# Patient Record
(demographics unavailable — no encounter records)

---

## 2024-11-27 NOTE — PHYSICAL EXAM
[Normocephalic] : normocephalic [Atraumatic] : atraumatic [Supple] : supple [No Supraclavicular Adenopathy] : no supraclavicular adenopathy [No Cervical Adenopathy] : no cervical adenopathy [Examined in the supine and seated position] : examined in the supine and seated position [Symmetrical] : symmetrical [Bra Size: ___] : Bra Size: [unfilled] [No dominant masses] : no dominant masses in right breast  [No dominant masses] : no dominant masses left breast [No Nipple Retraction] : no left nipple retraction [No Nipple Discharge] : no left nipple discharge [No Axillary Lymphadenopathy] : no left axillary lymphadenopathy [No Edema] : no edema [EOMI] : extra ocular movement intact [PERRL] : pupils equal, round and reactive to light [Sclera nonicteric] : sclera nonicteric [Conjunctiva pink] : conjunctiva pink [de-identified] : Periareolar incision is well healed

## 2024-11-27 NOTE — ASSESSMENT
[FreeTextEntry1] : 65 year old female with left breast upper-inner quadrant ALH. Biopsy results are concordant. She went on to have excisional biopsy with final pathology revealing ADH and LCIS. Trialed Tamoxifen and Exemestane with moderate side effects and is currently off all Endocrine therapy.   CBE is benign. Reviewed recent MMG/US with patient which is benign. Will obtain MRI breast in April 2025. Follow up in 6 months for next CBE.   Blood work drawn in the office to check LFTs and other basic lab work obtained - cbc, cmp, hemoglobin A1c, magnesium as patient reports she has not had labs in awhile and is trying to establish with a new PCP.   Rheumatology referral given to patient to evaluate diffuse joint pain/swelling.   Patient to follow up with med onc and PMD.   Patient verbalized understanding of all treatment plans. All of her questions were answered to the best of my ability she was encouraged to call the office for any questions or concerns.   Plan 1. Screening MMG/US Nov '25 2. MRI April '25 3. Rheumatology referral 4. Blood work - CBC, CMP, hemoglobin AIC, magnesium  5, Follow up with med onc and PMD 6. Follow up 6 months

## 2024-11-27 NOTE — PAST MEDICAL HISTORY
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

## 2024-11-27 NOTE — PHYSICAL EXAM
[Normocephalic] : normocephalic [Atraumatic] : atraumatic [Supple] : supple [No Supraclavicular Adenopathy] : no supraclavicular adenopathy [No Cervical Adenopathy] : no cervical adenopathy [Examined in the supine and seated position] : examined in the supine and seated position [Symmetrical] : symmetrical [Bra Size: ___] : Bra Size: [unfilled] [No dominant masses] : no dominant masses in right breast  [No dominant masses] : no dominant masses left breast [No Nipple Retraction] : no left nipple retraction [No Nipple Discharge] : no left nipple discharge [No Axillary Lymphadenopathy] : no left axillary lymphadenopathy [No Edema] : no edema [EOMI] : extra ocular movement intact [PERRL] : pupils equal, round and reactive to light [Sclera nonicteric] : sclera nonicteric [Conjunctiva pink] : conjunctiva pink [de-identified] : Periareolar incision is well healed

## 2024-11-27 NOTE — HISTORY OF PRESENT ILLNESS
[FreeTextEntry1] : Problem List: 1. Left breast upper inner biopsy-proven Atypical Lobular Hyperplasia; CONCORDANT     - calcs span 6 mm     - s/p left chi excisonal biopsy 2/17/23       FINAL PATH: LCIS, ADH     -Tamoxifen 4/23, --> Eexmestance stopped in September '24  2. Bilateral breast cysts  CARE TEAM PCP - Messi eparce- Rob --> Alexandra  INTERVAL HISTORY (03/01/23): 64 year old female presents for postop followup. She is recovering well from a surgical standpoint, but states she feels poorly being off of her HRT.   (11/15/23): Patient presents for 6 month follow up. Currently on tamoxifen. Was experiencing severe hot flashes, which have significantly improved since starting fezolinetant (veozah) which has helped. Denies breast complaints.   (05/28/24): Patient presents for follow up. Denies breast complaints. Since being on tamoxifen has experienced fatigue and gained 10-15 pounds, has been unable to lose the weight. Meet with nutritionist at [facility] but felt was not helpful. Has tried getting in with her PMD to check thyroid function to make sure symptoms not from that, but PMD is on leave.   (11/27/24): Patient presents for 6 month follow up. She switched from tamoxifen to exemestane but side effects did not improve and she stopped endocrine therapy in September. Patient feels she is still gaining weight. She met with [facility] nutritionist who felt she was eating appropriately and she has been trying to exercise, though she does find this difficult to do regularly since she watches her grandkids 5 days a week. She is on the Veozah, which has been helping her hot flashes but still experiences them at night. She has tried Gabapentin in the past without improvement. Additionally experiences a transaminitis that resolved after stopping the Gabapentin and Lipitor. She reports generalized joint pains, swelling, and edema that has not improved since stopping the chemoprevention.   HPI: - Patient is a 63 year female who presented for initial consultation 12/1/22 after recent breast biopsy. She went for routine screening mammogram which demonstrated a new area of calcifications. Diagnostic imaging and biopsy demonstrating a 6mm span of calcs with ALH.   BREAST HISTORY: She denies breast complaints. She does occasionally do self breast exams. This was her first breast biopsy.  IMAGING @ [facility]: 10/27/22- Screening mammogram: Density C. In the left breast 9:00 axis, middle depth, there is a new 6 mm grouping of microcalcifications. Magnification views are needed. - BIRADS 0  10/27/22 - Bilateral US: In the right breast 5:00 axis, 3 cm from the nipple, there is a previously seen 4 mm cyst. In the right breast 10:00 axis, 2 cm from the nipple, there is a 3 mm cyst. In the 4:00 periareolar left breast there is a previously seen deep 5 mm cyst with minimal internal debris, slightly decreased in size. No suspicious cystic or solid mass has developed.  11/07/22 - Left diagnostic mammogram: Indeterminant 6 mm grouping of microcalcifications left breast upper inner quadrant. Stereotactic biopsy recommended. - BIRADS 4  11/17/22 - Left stereotactic biopsy: Bar clip, displaced laterally by 4 mm. Pathology reveals Atypical lobular hyperplasia in the setting of fibrocystic changes. Intraluminal micro calcifications present associated with benign breast tissue. Results are CONCORDANT.  02/13/23 - [facility]: Breast Imaging Report - IMPRESSION: Status post Chi  localization of a bar clip, denoting the site biopsy proven ALH. PATHOLOGY: Left breast excisional biopsy, 11:00 axis = Biopsy site changes. Lobular carcinoma in situ and atypical ductal hyperplasia with mucinous features. Associated calcifications present.  02/17/23 - [facility]: Breast Imaging Report - IMPRESSION: Left breast specimen radiograph contains targeted calcifications, biopsy clip, and SaviScout. PATHOLOGY: Left breast excisional biopsy, 11:00 axis = Biopsy site changes. Lobular carcinoma in situ and atypical ductal hyperplasia with mucinous features. Associated calcifications present.  10/30/23: [facility]: Diagnostic Mammogram Bilateral and Bilateral Breast Ultrasound: Density C, Impression - No suspicious mammographic or sonographic findings. BI-RADS 2   04/09/24: [facility]: MRI Breast: Impression - Density C, No evidence of malignancy in either breast. BI-RADS 2   11/02/24: [facility]: Screening Mammogram Bilateral and US Breast Complete: Density C, Impression - No suspicious mammographic or sonographic findings. BI-RADS 2

## 2024-11-27 NOTE — PAST MEDICAL HISTORY
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

## 2024-11-27 NOTE — HISTORY OF PRESENT ILLNESS
[FreeTextEntry1] : Problem List: 1. Left breast upper inner biopsy-proven Atypical Lobular Hyperplasia; CONCORDANT     - calcs span 6 mm     - s/p left chi excisonal biopsy 2/17/23       FINAL PATH: LCIS, ADH     -Tamoxifen 4/23, --> Eexmestance stopped in September '24  2. Bilateral breast cysts  CARE TEAM PCP - Messi pearce- Rob --> Alexandra  INTERVAL HISTORY (03/01/23): 64 year old female presents for postop followup. She is recovering well from a surgical standpoint, but states she feels poorly being off of her HRT.   (11/15/23): Patient presents for 6 month follow up. Currently on tamoxifen. Was experiencing severe hot flashes, which have significantly improved since starting fezolinetant (veozah) which has helped. Denies breast complaints.   (05/28/24): Patient presents for follow up. Denies breast complaints. Since being on tamoxifen has experienced fatigue and gained 10-15 pounds, has been unable to lose the weight. Meet with nutritionist at [facility] but felt was not helpful. Has tried getting in with her PMD to check thyroid function to make sure symptoms not from that, but PMD is on leave.   (11/27/24): Patient presents for 6 month follow up. She switched from tamoxifen to exemestane but side effects did not improve and she stopped endocrine therapy in September. Patient feels she is still gaining weight. She met with [facility] nutritionist who felt she was eating appropriately and she has been trying to exercise, though she does find this difficult to do regularly since she watches her grandkids 5 days a week. She is on the Veozah, which has been helping her hot flashes but still experiences them at night. She has tried Gabapentin in the past without improvement. Additionally experiences a transaminitis that resolved after stopping the Gabapentin and Lipitor. She reports generalized joint pains, swelling, and edema that has not improved since stopping the chemoprevention.   HPI: - Patient is a 63 year female who presented for initial consultation 12/1/22 after recent breast biopsy. She went for routine screening mammogram which demonstrated a new area of calcifications. Diagnostic imaging and biopsy demonstrating a 6mm span of calcs with ALH.   BREAST HISTORY: She denies breast complaints. She does occasionally do self breast exams. This was her first breast biopsy.  IMAGING @ [facility]: 10/27/22- Screening mammogram: Density C. In the left breast 9:00 axis, middle depth, there is a new 6 mm grouping of microcalcifications. Magnification views are needed. - BIRADS 0  10/27/22 - Bilateral US: In the right breast 5:00 axis, 3 cm from the nipple, there is a previously seen 4 mm cyst. In the right breast 10:00 axis, 2 cm from the nipple, there is a 3 mm cyst. In the 4:00 periareolar left breast there is a previously seen deep 5 mm cyst with minimal internal debris, slightly decreased in size. No suspicious cystic or solid mass has developed.  11/07/22 - Left diagnostic mammogram: Indeterminant 6 mm grouping of microcalcifications left breast upper inner quadrant. Stereotactic biopsy recommended. - BIRADS 4  11/17/22 - Left stereotactic biopsy: Bar clip, displaced laterally by 4 mm. Pathology reveals Atypical lobular hyperplasia in the setting of fibrocystic changes. Intraluminal micro calcifications present associated with benign breast tissue. Results are CONCORDANT.  02/13/23 - [facility]: Breast Imaging Report - IMPRESSION: Status post Chi  localization of a bar clip, denoting the site biopsy proven ALH. PATHOLOGY: Left breast excisional biopsy, 11:00 axis = Biopsy site changes. Lobular carcinoma in situ and atypical ductal hyperplasia with mucinous features. Associated calcifications present.  02/17/23 - [facility]: Breast Imaging Report - IMPRESSION: Left breast specimen radiograph contains targeted calcifications, biopsy clip, and SaviScout. PATHOLOGY: Left breast excisional biopsy, 11:00 axis = Biopsy site changes. Lobular carcinoma in situ and atypical ductal hyperplasia with mucinous features. Associated calcifications present.  10/30/23: [facility]: Diagnostic Mammogram Bilateral and Bilateral Breast Ultrasound: Density C, Impression - No suspicious mammographic or sonographic findings. BI-RADS 2   04/09/24: [facility]: MRI Breast: Impression - Density C, No evidence of malignancy in either breast. BI-RADS 2   11/02/24: [facility]: Screening Mammogram Bilateral and US Breast Complete: Density C, Impression - No suspicious mammographic or sonographic findings. BI-RADS 2

## 2025-07-02 NOTE — HISTORY OF PRESENT ILLNESS
[FreeTextEntry1] : Problem List: 1. Left breast upper inner biopsy-proven Atypical Lobular Hyperplasia; CONCORDANT     - calcs span 6 mm     - s/p left chi excisonal biopsy 2/17/23       FINAL PATH: LCIS, ADH     -Tamoxifen started 4/23, --> Eexmestance --> Raloxifene stopped 2. Bilateral breast cysts  CARE TEAM PCP - Messi Rick onc- Rob --> Alexandra  INTERVAL HISTORY (03/01/23): 64 year old female presents for postop followup. She is recovering well from a surgical standpoint, but states she feels poorly being off of her HRT.   (11/15/23): Patient presents for 6 month follow up. Currently on tamoxifen. Was experiencing severe hot flashes, which have significantly improved since starting fezolinetant (veozah) which has helped. Denies breast complaints.   (05/28/24): Patient presents for follow up. Denies breast complaints. Since being on tamoxifen has experienced fatigue and gained 10-15 pounds, has been unable to lose the weight. Meet with nutritionist at [facility] but felt was not helpful. Has tried getting in with her PMD to check thyroid function to make sure symptoms not from that, but PMD is on leave.   (11/27/24): Patient presents for 6 month follow up. She switched from tamoxifen to exemestane but side effects did not improve and she stopped endocrine therapy in September. Patient feels she is still gaining weight. She met with [facility] nutritionist who felt she was eating appropriately and she has been trying to exercise, though she does find this difficult to do regularly since she watches her grandkids 5 days a week. She is on the Veozah, which has been helping her hot flashes but still experiences them at night. She has tried Gabapentin in the past without improvement. Additionally experiences a transaminitis that resolved after stopping the Gabapentin and Lipitor. She reports generalized joint pains, swelling, and edema that has not improved since stopping the chemoprevention.   (07/02/25): Patient presents for 6 month follow up. She was started on Raloxifene but has recently stopped that as well due to poor tolerance. Reports bloating, terrible hot flashes, vaginal dryness, weight gain, elevated A1C. She takes Veozah for the hot flashes and it does seem to help, however, it does not give her the longevity she needs for the hot flashes at night.    HPI: - Patient is a 63 year female who presented for initial consultation 12/1/22 after recent breast biopsy. She went for routine screening mammogram which demonstrated a new area of calcifications. Diagnostic imaging and biopsy demonstrating a 6mm span of calcs with ALH.   BREAST HISTORY: She denies breast complaints. She does occasionally do self breast exams. This was her first breast biopsy.  IMAGING @ [facility]: 10/27/22- Screening mammogram: Density C. In the left breast 9:00 axis, middle depth, there is a new 6 mm grouping of microcalcifications. Magnification views are needed. - BIRADS 0  10/27/22 - Bilateral US: In the right breast 5:00 axis, 3 cm from the nipple, there is a previously seen 4 mm cyst. In the right breast 10:00 axis, 2 cm from the nipple, there is a 3 mm cyst. In the 4:00 periareolar left breast there is a previously seen deep 5 mm cyst with minimal internal debris, slightly decreased in size. No suspicious cystic or solid mass has developed.  11/07/22 - Left diagnostic mammogram: Indeterminant 6 mm grouping of microcalcifications left breast upper inner quadrant. Stereotactic biopsy recommended. - BIRADS 4  11/17/22 - Left stereotactic biopsy: Bar clip, displaced laterally by 4 mm. Pathology reveals Atypical lobular hyperplasia in the setting of fibrocystic changes. Intraluminal micro calcifications present associated with benign breast tissue. Results are CONCORDANT.  02/13/23 - [facility]: Breast Imaging Report - IMPRESSION: Status post Chi  localization of a bar clip, denoting the site biopsy proven ALH. PATHOLOGY: Left breast excisional biopsy, 11:00 axis = Biopsy site changes. Lobular carcinoma in situ and atypical ductal hyperplasia with mucinous features. Associated calcifications present.  02/17/23 - [place]: Breast Imaging Report - IMPRESSION: Left breast specimen radiograph contains targeted calcifications, biopsy clip, and SaviScout. PATHOLOGY: Left breast excisional biopsy, 11:00 axis = Biopsy site changes. Lobular carcinoma in situ and atypical ductal hyperplasia with mucinous features. Associated calcifications present.  10/30/23: [facility]: Diagnostic Mammogram Bilateral and Bilateral Breast Ultrasound: Density C, Impression - No suspicious mammographic or sonographic findings. BI-RADS 2   04/09/24: [facility]: MRI Breast: Impression - Density C, No evidence of malignancy in either breast. BI-RADS 2   11/02/24: [facility]: Screening Mammogram Bilateral and US Breast Complete: Density C, Impression - No suspicious mammographic or sonographic findings. BI-RADS 2   04/12/25: [facility]: MRI Breast: Impression - No suspicous findings on breast MRI. BI-RADS 2

## 2025-07-02 NOTE — PAST MEDICAL HISTORY
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

## 2025-07-02 NOTE — PHYSICAL EXAM
[Normocephalic] : normocephalic [Atraumatic] : atraumatic [EOMI] : extra ocular movement intact [PERRL] : pupils equal, round and reactive to light [Sclera nonicteric] : sclera nonicteric [Conjunctiva pink] : conjunctiva pink [Supple] : supple [No Supraclavicular Adenopathy] : no supraclavicular adenopathy [No Cervical Adenopathy] : no cervical adenopathy [Examined in the supine and seated position] : examined in the supine and seated position [Symmetrical] : symmetrical [Bra Size: ___] : Bra Size: [unfilled] [No dominant masses] : no dominant masses in right breast  [No dominant masses] : no dominant masses left breast [No Nipple Retraction] : no left nipple retraction [No Nipple Discharge] : no left nipple discharge [No Axillary Lymphadenopathy] : no left axillary lymphadenopathy [No Edema] : no edema [de-identified] : Periareolar incision is well healed